# Patient Record
Sex: MALE | Race: WHITE | NOT HISPANIC OR LATINO | Employment: FULL TIME | URBAN - METROPOLITAN AREA
[De-identification: names, ages, dates, MRNs, and addresses within clinical notes are randomized per-mention and may not be internally consistent; named-entity substitution may affect disease eponyms.]

---

## 2017-02-22 ENCOUNTER — HOSPITAL ENCOUNTER (OUTPATIENT)
Dept: LAB | Facility: MEDICAL CENTER | Age: 47
End: 2017-02-22
Payer: COMMERCIAL

## 2017-02-22 LAB
BASOPHILS # BLD AUTO: 0.05 K/UL (ref 0–0.12)
BASOPHILS NFR BLD AUTO: 0.7 % (ref 0–1.8)
EOSINOPHIL # BLD: 0.2 K/UL (ref 0–0.51)
EOSINOPHIL NFR BLD AUTO: 2.6 % (ref 0–6.9)
ERYTHROCYTE [DISTWIDTH] IN BLOOD BY AUTOMATED COUNT: 43.7 FL (ref 35.9–50)
HCT VFR BLD AUTO: 53.9 % (ref 42–52)
HGB BLD-MCNC: 18.5 G/DL (ref 14–18)
IMM GRANULOCYTES # BLD AUTO: 0.02 K/UL (ref 0–0.11)
IMM GRANULOCYTES NFR BLD AUTO: 0.3 % (ref 0–0.9)
LYMPHOCYTES # BLD: 1.98 K/UL (ref 1–4.8)
LYMPHOCYTES NFR BLD AUTO: 26.2 % (ref 22–41)
MCH RBC QN AUTO: 30.7 PG (ref 27–33)
MCHC RBC AUTO-ENTMCNC: 34.3 G/DL (ref 33.7–35.3)
MCV RBC AUTO: 89.5 FL (ref 81.4–97.8)
MONOCYTES # BLD: 0.72 K/UL (ref 0–0.85)
MONOCYTES NFR BLD AUTO: 9.5 % (ref 0–13.4)
NEUTROPHILS # BLD: 4.58 K/UL (ref 1.82–7.42)
NEUTROPHILS NFR BLD AUTO: 60.7 % (ref 44–72)
NRBC # BLD AUTO: 0 K/UL
NRBC BLD-RTO: 0 /100 WBC
PLATELET # BLD AUTO: 214 K/UL (ref 164–446)
PMV BLD AUTO: 12.9 FL (ref 9–12.9)
RBC # BLD AUTO: 6.02 M/UL (ref 4.7–6.1)
WBC # BLD AUTO: 7.6 K/UL (ref 4.8–10.8)

## 2017-02-22 PROCEDURE — 85025 COMPLETE CBC W/AUTO DIFF WBC: CPT

## 2017-02-22 PROCEDURE — 36415 COLL VENOUS BLD VENIPUNCTURE: CPT

## 2017-02-22 PROCEDURE — 87522 HEPATITIS C REVRS TRNSCRPJ: CPT

## 2017-02-25 LAB
HCV RNA SERPL NAA+PROBE-ACNC: NORMAL IU/ML
TEST INFO  93644: NORMAL

## 2017-04-21 ENCOUNTER — HOSPITAL ENCOUNTER (OUTPATIENT)
Dept: LAB | Facility: MEDICAL CENTER | Age: 47
End: 2017-04-21
Payer: COMMERCIAL

## 2017-04-21 LAB
ALBUMIN SERPL BCP-MCNC: 4.1 G/DL (ref 3.2–4.9)
ALBUMIN/GLOB SERPL: 1.2 G/DL
ALP SERPL-CCNC: 94 U/L (ref 30–99)
ALT SERPL-CCNC: 26 U/L (ref 2–50)
ANION GAP SERPL CALC-SCNC: 5 MMOL/L (ref 0–11.9)
AST SERPL-CCNC: 22 U/L (ref 12–45)
BILIRUB SERPL-MCNC: 0.5 MG/DL (ref 0.1–1.5)
BUN SERPL-MCNC: 13 MG/DL (ref 8–22)
CALCIUM SERPL-MCNC: 9.1 MG/DL (ref 8.5–10.5)
CHLORIDE SERPL-SCNC: 103 MMOL/L (ref 96–112)
CO2 SERPL-SCNC: 29 MMOL/L (ref 20–33)
CREAT SERPL-MCNC: 1.15 MG/DL (ref 0.5–1.4)
GFR SERPL CREATININE-BSD FRML MDRD: >60 ML/MIN/1.73 M 2
GLOBULIN SER CALC-MCNC: 3.5 G/DL (ref 1.9–3.5)
GLUCOSE SERPL-MCNC: 132 MG/DL (ref 65–99)
POTASSIUM SERPL-SCNC: 4.3 MMOL/L (ref 3.6–5.5)
PROT SERPL-MCNC: 7.6 G/DL (ref 6–8.2)
SODIUM SERPL-SCNC: 137 MMOL/L (ref 135–145)

## 2017-04-21 PROCEDURE — 80053 COMPREHEN METABOLIC PANEL: CPT

## 2017-04-21 PROCEDURE — 36415 COLL VENOUS BLD VENIPUNCTURE: CPT

## 2017-06-29 ENCOUNTER — OFFICE VISIT (OUTPATIENT)
Dept: URGENT CARE | Facility: PHYSICIAN GROUP | Age: 47
End: 2017-06-29
Payer: COMMERCIAL

## 2017-06-29 VITALS
WEIGHT: 278 LBS | TEMPERATURE: 97.7 F | BODY MASS INDEX: 37.65 KG/M2 | SYSTOLIC BLOOD PRESSURE: 142 MMHG | DIASTOLIC BLOOD PRESSURE: 88 MMHG | OXYGEN SATURATION: 94 % | HEART RATE: 103 BPM | HEIGHT: 72 IN

## 2017-06-29 DIAGNOSIS — G51.0 BELL'S PALSY: ICD-10-CM

## 2017-06-29 DIAGNOSIS — Z88.9 H/O SEASONAL ALLERGIES: ICD-10-CM

## 2017-06-29 DIAGNOSIS — J34.89 NASAL CONGESTION WITH RHINORRHEA: ICD-10-CM

## 2017-06-29 DIAGNOSIS — H04.202 EYE TEARING, LEFT: ICD-10-CM

## 2017-06-29 DIAGNOSIS — R09.81 NASAL CONGESTION WITH RHINORRHEA: ICD-10-CM

## 2017-06-29 PROCEDURE — 99214 OFFICE O/P EST MOD 30 MIN: CPT | Performed by: NURSE PRACTITIONER

## 2017-06-29 RX ORDER — METHYLPREDNISOLONE 4 MG/1
TABLET ORAL
Qty: 1 KIT | Refills: 0 | Status: SHIPPED | OUTPATIENT
Start: 2017-06-29 | End: 2017-07-26

## 2017-06-29 ASSESSMENT — ENCOUNTER SYMPTOMS
WEAKNESS: 0
SORE THROAT: 1
FOCAL WEAKNESS: 0
DOUBLE VISION: 0
NAUSEA: 0
MYALGIAS: 0
SENSORY CHANGE: 1
PALPITATIONS: 0
DIZZINESS: 0
VOMITING: 0
HEADACHES: 0
EYE REDNESS: 0
PHOTOPHOBIA: 0
ABDOMINAL PAIN: 0
TINGLING: 1
CHILLS: 0
COUGH: 0
FEVER: 0
ORTHOPNEA: 0
HEARTBURN: 0
BLURRED VISION: 1
EYE DISCHARGE: 0
EYE PAIN: 0
SPEECH CHANGE: 0

## 2017-06-29 NOTE — PROGRESS NOTES
"Subjective:      Maciej Riley is a 46 y.o. male who presents with Blurred Vision            Blurred Vision  Associated symptoms include congestion and a sore throat. Pertinent negatives include no abdominal pain, chest pain, chills, coughing, fever, headaches, myalgias, nausea, vomiting or weakness.   Maciej is 46 year old male who is here for left sided facial droop x 4 days.  was in [place] x 2 weeks came back the next day and had left eye tearing.  [place]ives [place] but here x 1 year to work for [employer]. States h/o seasonal allergies. States thought this was \"allergy related\". Was seen by optometrist for eye problem. Was given an eye abx. States then began to have facial numbness above lip and at left side of mouth. States people noticed the left side of his mouth was \"not working\". States when eating he can't chew well on left side. Denies severe acute HA, weakness, confusion, dizziness. No h/o high cholesterol. Denies head trauma, injury or fall, no h/o longer term NSAID use. States his father had a stroke at age 54 life but has co-morbidities as well. States took Ambien x 3 days (h/o difficulty sleeping) while on vacation but stopped taking this and states \"then it takes a while to regulate my sleep after stopping it\". States seasonal allergies are \"more severe than usual\". Denies difficulty with swallowing.    PMH:  has a past medical history of GERD (gastroesophageal reflux disease) and Hep C w/o coma, chronic (CMS-HCC).  MEDS:   Current outpatient prescriptions:   •  MethylPREDNISolone (MEDROL DOSEPAK) 4 MG Tablet Therapy Pack, Use as directed, Disp: 1 Kit, Rfl: 0  •  OMEPRAZOLE PO, Take  by mouth., Disp: , Rfl:   •  Zolpidem Tartrate (AMBIEN PO), Take  by mouth., Disp: , Rfl:   •  OXYCODONE HCL PO, Take  by mouth., Disp: , Rfl:   •  cyclobenzaprine (FLEXERIL) 10 MG Tab, Take 1 Tab by mouth 3 times a day as needed for Muscle Spasms., Disp: 21 Tab, Rfl: 0  ALLERGIES:   Allergies   Allergen " Reactions   • Clindamycin      SURGHX: History reviewed. No pertinent past surgical history.  SOCHX:  reports that he has never smoked. He does not have any smokeless tobacco history on file.  FH: Family history was reviewed, no pertinent findings to report      Review of Systems   Constitutional: Negative for fever, chills and malaise/fatigue.   HENT: Positive for congestion and sore throat. Negative for ear pain.    Eyes: Positive for blurred vision. Negative for double vision, photophobia, pain, discharge and redness.   Respiratory: Negative for cough.    Cardiovascular: Negative for chest pain, palpitations, orthopnea and leg swelling.   Gastrointestinal: Negative for heartburn, nausea, vomiting and abdominal pain.   Musculoskeletal: Negative for myalgias.   Neurological: Positive for tingling and sensory change. Negative for dizziness, speech change, focal weakness, weakness and headaches.   Endo/Heme/Allergies: Positive for environmental allergies.   All other systems reviewed and are negative.         Objective:     /88 mmHg  Pulse 103  Temp(Src) 36.5 °C (97.7 °F)  Ht 1.829 m (6')  Wt 126.1 kg (278 lb)  BMI 37.70 kg/m2  SpO2 94%     Physical Exam   Constitutional: He is oriented to person, place, and time. Vital signs are normal. He appears well-developed and well-nourished. He is active and cooperative.  Non-toxic appearance. He does not have a sickly appearance. He does not appear ill. No distress.   HENT:   Head: Normocephalic.   Mouth/Throat: Mucous membranes are dry. Posterior oropharyngeal erythema present.       Left side of mouth not moving with speech, no drooling seen, uneven smile   Eyes: Conjunctivae and EOM are normal. Pupils are equal, round, and reactive to light. Left eye exhibits no chemosis, no discharge, no exudate and no hordeolum. No foreign body present in the left eye. Left conjunctiva is not injected. Left conjunctiva has no hemorrhage.   Decreased left eye blinking, clear  eye discharge, slight left side eye droop   Neck: Normal range of motion. Neck supple.   Cardiovascular: Normal rate and regular rhythm.    Pulmonary/Chest: Effort normal and breath sounds normal.   Musculoskeletal: Normal range of motion.   Neurological: He is alert and oriented to person, place, and time. He has normal strength. No cranial nerve deficit or sensory deficit. Coordination and gait normal. GCS eye subscore is 4. GCS verbal subscore is 5. GCS motor subscore is 6.   Skin: Skin is warm and dry. He is not diaphoretic.   Psychiatric: He has a normal mood and affect. His speech is normal and behavior is normal. Judgment and thought content normal. He is not actively hallucinating. Cognition and memory are normal. He is attentive.   Vitals reviewed.    Declines CT head             Assessment/Plan:     1. Bell's palsy    - MethylPREDNISolone (MEDROL DOSEPAK) 4 MG Tablet Therapy Pack; Use as directed  Dispense: 1 Kit; Refill: 0    2. H/O seasonal allergies    3. Nasal congestion with rhinorrhea    4. Eye tearing, left    Increase water intake  Get rest  May use Ibuprofen/Tylenol prn for any fever, body aches or throat pain  May take longer acting antihistamine for seasonal allergy symptoms prn  May use saline nasal spray for nasal congestion  May use Flonase or Nasocort for allergen nasal congestion  May gargle with salt water prn for throat discomfort  May drink smoothies for nutrition if too painful to swallow solid foods  Monitor for productive cough, SOB and chest pain/tightness- need re-evaluation  May follow up with optometrist prn   May follow up in 1 week if no improvement with facial droop or come back immediately with worsening symptoms

## 2017-06-29 NOTE — MR AVS SNAPSHOT
"        Maciej Straussjose david   2017 10:45 AM   Office Visit   MRN: [id number]    Department:  [facility]   Dept Phone:  [phone]    Description:  Male : 1970   Provider:  BING Hawkins           Reason for Visit     Blurred Vision allergies, \"feels like my throat is swollen and the left side of my face is numb\"      Allergies as of 2017     Allergen Noted Reactions    Clindamycin 2016         You were diagnosed with     Bell's palsy   [351.0.ICD-9-CM]       H/O seasonal allergies   [738262]       Nasal congestion with rhinorrhea   [765605]       Eye tearing, left   [348093]         Vital Signs     Blood Pressure Pulse Temperature Height Weight Body Mass Index    142/88 mmHg 103 36.5 °C (97.7 °F) 1.829 m (6') 126.1 kg (278 lb) 37.70 kg/m2    Oxygen Saturation Smoking Status                94% Never Smoker           Basic Information     Date Of Birth Sex Race Ethnicity Preferred Language    1970 Male White Non- English      Health Maintenance        Date Due Completion Dates    IMM DTaP/Tdap/Td Vaccine (1 - Tdap) 10/7/1989 ---            Current Immunizations     No immunizations on file.      Below and/or attached are the medications your provider expects you to take. Review all of your home medications and newly ordered medications with your provider and/or pharmacist. Follow medication instructions as directed by your provider and/or pharmacist. Please keep your medication list with you and share with your provider. Update the information when medications are discontinued, doses are changed, or new medications (including over-the-counter products) are added; and carry medication information at all times in the event of emergency situations     Allergies:  CLINDAMYCIN - (reactions not documented)               Medications  Valid as of: 2017 -  3:16 PM    Generic Name Brand Name Tablet Size Instructions for use    Cyclobenzaprine HCl (Tab) FLEXERIL 10 MG " Take 1 Tab by mouth 3 times a day as needed for Muscle Spasms.        MethylPREDNISolone (Tablet Therapy Pack) MEDROL DOSEPAK 4 MG Use as directed        Omeprazole   Take  by mouth.        OxyCODONE HCl   Take  by mouth.        Zolpidem Tartrate   Take  by mouth.        .                 Medicines prescribed today were sent to:     [id number] - [place], [place] - [address]    [address] [place] [address]    Phone: [phone] Fax: [phone]    Open 24 Hours?: No      Medication refill instructions:       If your prescription bottle indicates you have medication refills left, it is not necessary to call your provider’s office. Please contact your pharmacy and they will refill your medication.    If your prescription bottle indicates you do not have any refills left, you may request refills at any time through one of the following ways: The online [employer]de[employer] system (except Urgent Care), by calling your provider’s office, or by asking your pharmacy to contact your provider’s office with a refill request. Medication refills are processed only during regular business hours and may not be available until the next business day. Your provider may request additional information or to have a follow-up visit with you prior to refilling your medication.   *Please Note: Medication refills are assigned a new Rx number when refilled electronically. Your pharmacy may indicate that no refills were authorized even though a new prescription for the same medication is available at the pharmacy. Please request the medicine by name with the pharmacy before contacting your provider for a refill.           Loudeye Access Code: [email or web address]  Expires: 7/29/2017  3:16 PM    Your email address is not on file at [facility].  Email Addresses are required for you to sign up for [employer], please contact [phone] to verify your personal information and to provide your email address prior to attempting to register for  [employer].    Maciej Olivakeith  [address] [place] [address]    Localbasehart  A secure, online tool to manage your health information     [employer]’s [employer]® is a secure, online tool that connects you to your personalized health information from the privacy of your home -- day or night - making it very easy for you to manage your healthcare. Once the activation process is completed, you can even access your medical information using the [employer] mario, which is available for free in the Apple Mario store or Google Play store.     To learn more about [employer], visit [email or web address]    There are two levels of access available (as shown below):   My Chart Features  [facility] Primary Care Doctor [facility]  Specialists [facility]  Urgent  Care Non-[facility] Primary Care Doctor   Email your healthcare team securely and privately 24/7 X X X    Manage appointments: schedule your next appointment; view details of past/upcoming appointments X      Request prescription refills. X      View recent personal medical records, including lab and immunizations X X X X   View health record, including health history, allergies, medications X X X X   Read reports about your outpatient visits, procedures, consult and ER notes X X X X   See your discharge summary, which is a recap of your hospital and/or ER visit that includes your diagnosis, lab results, and care plan X X  X     How to register for [employer]:  Once your e-mail address has been verified, follow the following steps to sign up for [employer].     1. Go to  [email or web address]  2. Click on the Sign Up Now box, which takes you to the New Member Sign Up page. You will need to provide the following information:  a. Enter your [employer] Access Code exactly as it appears at the top of this page. (You will not need to use this code after you’ve completed the sign-up process. If you do not sign up before the expiration date, you must request a new code.)   b. Enter your date of birth.    c. Enter your home email address.   d. Click Submit, and follow the next screen’s instructions.  3. Create a [employer] ID. This will be your [employer] login ID and cannot be changed, so think of one that is secure and easy to remember.  4. Create a [employer] password. You can change your password at any time.  5. Enter your Password Reset Question and Answer. This can be used at a later time if you forget your password.   6. Enter your e-mail address. This allows you to receive e-mail notifications when new information is available in [employer] [facility].  7. Click Sign Up. You can now view your health information.    For assistance activating your [employer] account, call [phone]

## 2017-07-13 ENCOUNTER — HOSPITAL ENCOUNTER (OUTPATIENT)
Dept: LAB | Facility: MEDICAL CENTER | Age: 47
End: 2017-07-13
Attending: INTERNAL MEDICINE
Payer: COMMERCIAL

## 2017-07-13 PROCEDURE — 36415 COLL VENOUS BLD VENIPUNCTURE: CPT

## 2017-07-13 PROCEDURE — 87522 HEPATITIS C REVRS TRNSCRPJ: CPT

## 2017-07-16 LAB
HCV RNA SERPL NAA+PROBE-ACNC: NORMAL IU/ML
TEST INFO  93644: NORMAL

## 2017-07-26 ENCOUNTER — OFFICE VISIT (OUTPATIENT)
Dept: MEDICAL GROUP | Facility: PHYSICIAN GROUP | Age: 47
End: 2017-07-26
Payer: COMMERCIAL

## 2017-07-26 VITALS
SYSTOLIC BLOOD PRESSURE: 136 MMHG | OXYGEN SATURATION: 92 % | HEIGHT: 72 IN | BODY MASS INDEX: 38.33 KG/M2 | WEIGHT: 283 LBS | HEART RATE: 80 BPM | RESPIRATION RATE: 16 BRPM | TEMPERATURE: 97.3 F | DIASTOLIC BLOOD PRESSURE: 90 MMHG

## 2017-07-26 DIAGNOSIS — B35.3 TINEA PEDIS OF BOTH FEET: ICD-10-CM

## 2017-07-26 DIAGNOSIS — B18.2 CHRONIC HEPATITIS C WITHOUT HEPATIC COMA (HCC): ICD-10-CM

## 2017-07-26 DIAGNOSIS — Z91.09 ENVIRONMENTAL ALLERGIES: ICD-10-CM

## 2017-07-26 DIAGNOSIS — M25.562 CHRONIC PAIN OF BOTH KNEES: ICD-10-CM

## 2017-07-26 DIAGNOSIS — M25.561 CHRONIC PAIN OF BOTH KNEES: ICD-10-CM

## 2017-07-26 DIAGNOSIS — G89.29 CHRONIC PAIN OF BOTH KNEES: ICD-10-CM

## 2017-07-26 DIAGNOSIS — Z76.89 ENCOUNTER TO ESTABLISH CARE: ICD-10-CM

## 2017-07-26 PROBLEM — J30.2 SEASONAL ALLERGIES: Status: ACTIVE | Noted: 2017-07-26

## 2017-07-26 PROCEDURE — 99213 OFFICE O/P EST LOW 20 MIN: CPT | Performed by: NURSE PRACTITIONER

## 2017-07-26 RX ORDER — ZOLPIDEM TARTRATE 10 MG/1
10 TABLET ORAL NIGHTLY PRN
COMMUNITY
End: 2017-07-26

## 2017-07-26 RX ORDER — TERBINAFINE HYDROCHLORIDE 250 MG/1
250 TABLET ORAL DAILY
Qty: 42 TAB | Refills: 0 | Status: SHIPPED | OUTPATIENT
Start: 2017-07-26

## 2017-07-26 RX ORDER — OMEPRAZOLE 20 MG/1
20 CAPSULE, DELAYED RELEASE ORAL DAILY
COMMUNITY

## 2017-07-26 RX ORDER — OXYCODONE HYDROCHLORIDE 5 MG/1
5 TABLET ORAL EVERY 12 HOURS PRN
Qty: 20 TAB | Refills: 0 | Status: SHIPPED | OUTPATIENT
Start: 2017-07-26

## 2017-07-26 RX ORDER — TADALAFIL 20 MG/1
20 TABLET ORAL PRN
COMMUNITY

## 2017-07-26 ASSESSMENT — PATIENT HEALTH QUESTIONNAIRE - PHQ9: CLINICAL INTERPRETATION OF PHQ2 SCORE: 0

## 2017-07-26 NOTE — ASSESSMENT & PLAN NOTE
Chronic problem over the past 6 years. Reports that it was occurring mostly on left foot, with very minimal on right. He was previously working with PCP in order again and failed every topical treatment tried. Although he does have history of hepatitis C, they did 30 days of oral antifungal, which nearly resolved the infection. Patient then traveled to [place] for one month and was not able to follow-up with PCP to get the second refill. Reports that it remains resolved on the right, but left still has minimal infection present. 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 Must wear work boots at work, but wears breathable shoes otherwise

## 2017-07-26 NOTE — PROGRESS NOTES
Chief Complaint   Patient presents with   • New Patient     establish care, athletes foot, med refills, recent bells palsy, allergies       HPI:    Maciej Riley is a 46 y.o. male here to establish care  Athlete's foot  Chronic problem over the past 6 years. Reports that it was occurring mostly on left foot, with very minimal on right. He was previously working with PCP in order again and failed every topical treatment tried. Although he does have history of hepatitis C, they did 30 days of oral antifungal, which nearly resolved the infection. Patient then traveled to [place] for one month and was not able to follow-up with PCP to get the second refill. Reports that it remains resolved on the right, but left still has minimal infection present. 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 Must wear work boots at work, but wears breathable shoes otherwise    Chronic hepatitis C virus infection (CMS-HCC)  Dx 2002. [place] in [place] and has completed tx, with most recent labs revealing no active disease.     Environmental allergies  States he is allergic to dust and cats. He gets nasal congestion and itching eyes. Zyrtec is not working. Cannot tolerate nasal spray. Reports symptoms mild-moderate.     Chronic pain of both knees  Has hx of meniscal repair right knee, and has current tear of meniscus left knee. States his [place] PCP gives him oxycodone for knee pain during backpacking season.     BMI 38.0-38.9,adult  Patient reports chronic obesity in self and his entire family. He does exercise regularly, especially with hiking and backpacking. He does try to monitor diet, but reports that he just has chronic issues with weight      Current medicines (including changes today)  Current Outpatient Prescriptions   Medication Sig Dispense Refill   • omeprazole (PRILOSEC) 20 MG delayed-release capsule Take 20 mg by mouth every day.     • tadalafil  "(CIALIS) 20 MG tablet Take 20 mg by mouth as needed for Erectile Dysfunction.     • terbinafine (LAMISIL) 250 MG Tab Take 1 Tab by mouth every day. 42 Tab 0   • oxycodone immediate-release (ROXICODONE) 5 MG Tab Take 1 Tab by mouth every 12 hours as needed for Severe Pain. 20 Tab 0     No current facility-administered medications for this visit.     He  has a past medical history of GERD (gastroesophageal reflux disease); Hep C w/o coma, chronic (CMS-[facility]); Bell's palsy (2017); and Tinea pedis.  He  has past surgical history that includes meniscus repair (Right).  Social History   Substance Use Topics   • Smoking status: Never Smoker    • Smokeless tobacco: Never Used   • Alcohol Use: No     Social History     Social History Narrative     with 3 children. Works full-time as a hendrix at [employer]     Family History   Problem Relation Age of Onset   • Diabetes Father    • Heart Failure Father    • Hypertension Father    • Stroke Father      Family Status   Relation Status Death Age   • Mother Alive    • Father Alive    • Sister Alive      Health Maintenance Topics with due status: Overdue       Topic Date Due    IMM DTaP/Tdap/Td Vaccine 10/07/1989        ROS  See form completed by patient, reviewed by me and no changes necessary. Scanned into chart.   Pertinent positives:  HEENT: +wears glasses, +itching eyes, headaches, rhinorrhea with congestion, allergies  CV: +episode of edema of ankles/feet while in [place], but resolved  GI: +Hep C, +food stuck in throat sometimes  Skin: +athlete's foot left foot   All other systems reviewed by me and are negative.      Objective:     Blood pressure 136/90, pulse 80, temperature 36.3 °C (97.3 °F), resp. rate 16, height 1.829 m (6' 0.01\"), weight 128.368 kg (283 lb), SpO2 92 %. Body mass index is 38.37 kg/(m^2).  Physical Exam:  Alert, oriented in no acute distress.  Eye contact is good, speech goal directed, affect bright.  HEENT: EOMI, conjunctiva non-injected, sclera " non-icteric. Trace bilateral lid edema. No eye drainage  Nares patent with no significant congestion or drainage.   Gross hearing intact   Neck: supple with no cervical or supraclavicular lymphadenopathy, palpable thyroid nodules or thyromegaly.  Lungs: unlabored, clear to auscultation bilaterally with good excursion.  CV: regular rate and rhythm, no murmurs  Lower extremities color normal, vascularity normal, no edema, temperature normal  Skin: Left foot second and third toe with erythematous patchy rash, worse between the toes   Neuro: CNs grossly intact. Gait steady. Strength all extremities 5/5.         Assessment and Plan:   Assessment/Plan:  1. Encounter to establish care    2. Tinea pedis of both feet  Chronic problem, not controlled. Restart terbinafine for a maximum of 42 days d/t caution with Hep C, although tolerated well before and GI was okay with this. Monitor for response. Discussed non-pharmacological tx, although he is already well versed in this   - terbinafine (LAMISIL) 250 MG Tab; Take 1 Tab by mouth every day.  Dispense: 42 Tab; Refill: 0  - HEPATIC FUNCTION PANEL; Future    3. Chronic hepatitis C without hepatic coma (CMS-HCC)  Stable. Continue follow-up with gastroenterology in [place]. Monitor labs in 4 weeks due to use of terbinafine  - HEPATIC FUNCTION PANEL; Future    4. Environmental allergies  Chronic problem, mild to moderate. Encouraged Allegra 180 mg daily and Alaway eyedrops with mineral oil     5. Chronic pain of both knees  Stable, but discussed he can use oxy PRN when backpacking for pain  - oxycodone immediate-release (ROXICODONE) 5 MG Tab; Take 1 Tab by mouth every 12 hours as needed for Severe Pain.  Dispense: 20 Tab; Refill: 0    6. BMI 38.0-38.9,adult  - Patient identified as having weight management issue.  Appropriate orders and counseling given.       Follow up:  Return in about 1 year (around 7/26/2018).    Educated in proper administration of medication(s) ordered today  including safety, possible SE, risks, benefits, rationale and alternatives to therapy.   Supportive care, differential diagnoses, and indications for immediate follow-up discussed with patient.    Pathogenesis of diagnosis discussed including typical length and natural progression.    Instructed to return to clinic or nearest emergency department for any change in condition, further concerns, or worsening of symptoms.  Patient states understanding of the plan of care and discharge instructions.    Please note that this dictation was created using voice recognition software. I have made every reasonable attempt to correct obvious errors, but I expect that there are errors of grammar and possibly content that I did not discover before finalizing the note.    Followup: Return in about 1 year (around 7/26/2018). sooner should new symptoms or problems arise.

## 2017-07-26 NOTE — ASSESSMENT & PLAN NOTE
States he is allergic to dust and cats. He gets nasal congestion and itching eyes. Zyrtec is not working. Cannot tolerate nasal spray. Reports symptoms mild-moderate.

## 2017-07-26 NOTE — ASSESSMENT & PLAN NOTE
Dx 2002. Followed by physician in [place] and has completed tx, with most recent labs revealing no active disease.

## 2017-07-26 NOTE — ASSESSMENT & PLAN NOTE
Has hx of meniscal repair right knee, and has current tear of meniscus left knee. States his [place] PCP gives him oxycodone for knee pain during backpacking season.

## 2017-07-26 NOTE — MR AVS SNAPSHOT
"Maciej Riley   2017 9:20 AM   Office Visit   MRN: [id number]    Department:  [facility]   Dept Phone:  [phone]    Description:  Male : 1970   Provider:  ALEXIS Dotson           Reason for Visit     New Patient establish care, athletes foot, med refills, recent bells palsy, allergies      Allergies as of 2017     Allergen Noted Reactions    Clindamycin 2016         You were diagnosed with     Encounter to establish care   [128099]       BMI 38.0-38.9,adult   [925165]       Tinea pedis of both feet   [5598813]       Chronic hepatitis C without hepatic coma (CMS-HCC)   [632550]       Environmental allergies   [518511]       Chronic pain of both knees   [6088552]         Vital Signs     Blood Pressure Pulse Temperature Respirations Height Weight    136/90 mmHg 80 36.3 °C (97.3 °F) 16 1.829 m (6' 0.01\") 128.368 kg (283 lb)    Body Mass Index Oxygen Saturation Smoking Status             38.37 kg/m2 92% Never Smoker          Basic Information     Date Of Birth Sex Race Ethnicity Preferred Language    1970 Male White Non- English      Problem List              ICD-10-CM Priority Class Noted - Resolved    BMI 38.0-38.9,adult Z68.38   2017 - Present    Athlete's foot B35.3   2017 - Present    Chronic hepatitis C virus infection (CMS-HCC) B18.2   2017 - Present    Environmental allergies Z91.09   2017 - Present    Chronic pain of both knees M25.561, M25.562, G89.29   2017 - Present      Health Maintenance        Date Due Completion Dates    IMM DTaP/Tdap/Td Vaccine (1 - Tdap) 10/7/1989 ---    IMM INFLUENZA (1) 2017 ---            Current Immunizations     No immunizations on file.      Below and/or attached are the medications your provider expects you to take. Review all of your home medications and newly ordered medications with your provider and/or pharmacist. Follow medication instructions as directed by your provider and/or " pharmacist. Please keep your medication list with you and share with your provider. Update the information when medications are discontinued, doses are changed, or new medications (including over-the-counter products) are added; and carry medication information at all times in the event of emergency situations     Allergies:  CLINDAMYCIN - (reactions not documented)               Medications  Valid as of: July 26, 2017 - 10:21 AM    Generic Name Brand Name Tablet Size Instructions for use    Omeprazole (CAPSULE DELAYED RELEASE) PRILOSEC 20 MG Take 20 mg by mouth every day.        OxyCODONE HCl (Tab) ROXICODONE 5 MG Take 1 Tab by mouth every 12 hours as needed for Severe Pain.        Tadalafil (Tab) CIALIS 20 MG Take 20 mg by mouth as needed for Erectile Dysfunction.        Terbinafine HCl (Tab) LAMISIL 250 MG Take 1 Tab by mouth every day.        .                 Medicines prescribed today were sent to:     [id number] - [place], [place] - [address]    [address] [facility] [address]    Phone: [phone] Fax: [phone]    Open 24 Hours?: No      Medication refill instructions:       If your prescription bottle indicates you have medication refills left, it is not necessary to call your provider’s office. Please contact your pharmacy and they will refill your medication.    If your prescription bottle indicates you do not have any refills left, you may request refills at any time through one of the following ways: The online [employer] system (except Urgent Care), by calling your provider’s office, or by asking your pharmacy to contact your provider’s office with a refill request. Medication refills are processed only during regular business hours and may not be available until the next business day. Your provider may request additional information or to have a follow-up visit with you prior to refilling your medication.   *Please Note: Medication refills are assigned a new Rx number when refilled  electronically. Your pharmacy may indicate that no refills were authorized even though a new prescription for the same medication is available at the pharmacy. Please request the medicine by name with the pharmacy before contacting your provider for a refill.        Your To Do List     Future Labs/Procedures Complete By Expires    HEPATIC FUNCTION PANEL  As directed 7/26/2018         [employer] Access Code: [email or web address]  Expires: 7/29/2017  3:16 PM    Your email address is not on file at [facility].  Email Addresses are required for you to sign up for [employer], please contact [phone] to verify your personal information and to provide your email address prior to attempting to register for [employer].    Maciej Riley  [address] [place] [address]    [employer]  A secure, online tool to manage your health information     [facility]’s [employer]® is a secure, online tool that connects you to your personalized health information from the privacy of your home -- day or night - making it very easy for you to manage your healthcare. Once the activation process is completed, you can even access your medical information using the [employer] mario, which is available for free in the Apple Mario store or Google Play store.     To learn more about [employer], visit [email or web address]    There are two levels of access available (as shown below):   My Chart Features  [facility] Primary Care Doctor [facility]  Specialists [facility]  Urgent  Care Non-[facility] Primary Care Doctor   Email your healthcare team securely and privately 24/7 X X X    Manage appointments: schedule your next appointment; view details of past/upcoming appointments X      Request prescription refills. X      View recent personal medical records, including lab and immunizations X X X X   View health record, including health history, allergies, medications X X X X   Read reports about your outpatient visits, procedures, consult and ER notes X X X X   See your  discharge summary, which is a recap of your hospital and/or ER visit that includes your diagnosis, lab results, and care plan X X  X     How to register for [employer]:  Once your e-mail address has been verified, follow the following steps to sign up for [employer].     1. Go to  [email or web address]  2. Click on the Sign Up Now box, which takes you to the New Member Sign Up page. You will need to provide the following information:  a. Enter your [employer] Access Code exactly as it appears at the top of this page. (You will not need to use this code after you’ve completed the sign-up process. If you do not sign up before the expiration date, you must request a new code.)   b. Enter your date of birth.   c. Enter your home email address.   d. Click Submit, and follow the next screen’s instructions.  3. Create a [employer] ID. This will be your [employer] login ID and cannot be changed, so think of one that is secure and easy to remember.  4. Create a [employer] password. You can change your password at any time.  5. Enter your Password Reset Question and Answer. This can be used at a later time if you forget your password.   6. Enter your e-mail address. This allows you to receive e-mail notifications when new information is available in [employer].  7. Click Sign Up. You can now view your health information.    For assistance activating your [employer] account, call [phone]

## 2017-07-26 NOTE — ASSESSMENT & PLAN NOTE
Patient reports chronic obesity in self and his entire family. He does exercise regularly, especially with hiking and backpacking. He does try to monitor diet, but reports that he just has chronic issues with weight